# Patient Record
Sex: FEMALE | Race: AMERICAN INDIAN OR ALASKA NATIVE
[De-identification: names, ages, dates, MRNs, and addresses within clinical notes are randomized per-mention and may not be internally consistent; named-entity substitution may affect disease eponyms.]

---

## 2020-05-03 ENCOUNTER — HOSPITAL ENCOUNTER (EMERGENCY)
Dept: HOSPITAL 5 - ED | Age: 35
Discharge: HOME | End: 2020-05-03
Payer: COMMERCIAL

## 2020-05-03 VITALS — SYSTOLIC BLOOD PRESSURE: 129 MMHG | DIASTOLIC BLOOD PRESSURE: 84 MMHG

## 2020-05-03 DIAGNOSIS — Z79.899: ICD-10-CM

## 2020-05-03 DIAGNOSIS — K21.9: Primary | ICD-10-CM

## 2020-05-03 DIAGNOSIS — Z88.0: ICD-10-CM

## 2020-05-03 DIAGNOSIS — R11.2: ICD-10-CM

## 2020-05-03 DIAGNOSIS — R10.13: ICD-10-CM

## 2020-05-03 DIAGNOSIS — K80.20: ICD-10-CM

## 2020-05-03 LAB
ALBUMIN SERPL-MCNC: 4.2 G/DL (ref 3.9–5)
ALT SERPL-CCNC: 36 UNITS/L (ref 7–56)
BACTERIA #/AREA URNS HPF: (no result) /HPF
BASOPHILS # (AUTO): 0 K/MM3 (ref 0–0.1)
BASOPHILS NFR BLD AUTO: 0.2 % (ref 0–1.8)
BILIRUB UR QL STRIP: (no result)
BLOOD UR QL VISUAL: (no result)
BUN SERPL-MCNC: 9 MG/DL (ref 7–17)
BUN/CREAT SERPL: 11 %
CALCIUM SERPL-MCNC: 9.3 MG/DL (ref 8.4–10.2)
EOSINOPHIL # BLD AUTO: 0.1 K/MM3 (ref 0–0.4)
EOSINOPHIL NFR BLD AUTO: 1.2 % (ref 0–4.3)
HCT VFR BLD CALC: 41.8 % (ref 30.3–42.9)
HEMOLYSIS INDEX: 21
HGB BLD-MCNC: 13.2 GM/DL (ref 10.1–14.3)
LYMPHOCYTES # BLD AUTO: 3 K/MM3 (ref 1.2–5.4)
LYMPHOCYTES NFR BLD AUTO: 46.7 % (ref 13.4–35)
MCHC RBC AUTO-ENTMCNC: 32 % (ref 30–34)
MCV RBC AUTO: 81 FL (ref 79–97)
MONOCYTES # (AUTO): 0.5 K/MM3 (ref 0–0.8)
MONOCYTES % (AUTO): 8.2 % (ref 0–7.3)
MUCOUS THREADS #/AREA URNS HPF: (no result) /HPF
PH UR STRIP: 5 [PH] (ref 5–7)
PLATELET # BLD: 286 K/MM3 (ref 140–440)
PROT UR STRIP-MCNC: (no result) MG/DL
RBC # BLD AUTO: 5.16 M/MM3 (ref 3.65–5.03)
RBC #/AREA URNS HPF: 3 /HPF (ref 0–6)
UROBILINOGEN UR-MCNC: 2 MG/DL (ref ?–2)
WBC #/AREA URNS HPF: 3 /HPF (ref 0–6)

## 2020-05-03 PROCEDURE — 76705 ECHO EXAM OF ABDOMEN: CPT

## 2020-05-03 PROCEDURE — 83690 ASSAY OF LIPASE: CPT

## 2020-05-03 PROCEDURE — 96374 THER/PROPH/DIAG INJ IV PUSH: CPT

## 2020-05-03 PROCEDURE — 96375 TX/PRO/DX INJ NEW DRUG ADDON: CPT

## 2020-05-03 PROCEDURE — 36415 COLL VENOUS BLD VENIPUNCTURE: CPT

## 2020-05-03 PROCEDURE — 84703 CHORIONIC GONADOTROPIN ASSAY: CPT

## 2020-05-03 PROCEDURE — 85025 COMPLETE CBC W/AUTO DIFF WBC: CPT

## 2020-05-03 PROCEDURE — 96361 HYDRATE IV INFUSION ADD-ON: CPT

## 2020-05-03 PROCEDURE — 80053 COMPREHEN METABOLIC PANEL: CPT

## 2020-05-03 PROCEDURE — 99284 EMERGENCY DEPT VISIT MOD MDM: CPT

## 2020-05-03 PROCEDURE — 96372 THER/PROPH/DIAG INJ SC/IM: CPT

## 2020-05-03 PROCEDURE — 81001 URINALYSIS AUTO W/SCOPE: CPT

## 2020-05-03 NOTE — EMERGENCY DEPARTMENT REPORT
ED Abdominal Pain HPI





- General


Chief Complaint: Abdominal Pain


Stated Complaint: GALLSTONE PAIN


Source: patient


Mode of arrival: Ambulatory


Limitations: No Limitations





- History of Present Illness


Initial Comments: 





Patient is a A2 34-year-old -American female with no past medical 

history except gallstones and who presents to the ED with acute onset persistent

severe epigastric pain with intractable nausea and vomiting for the last 2 

hours.  Patient states that she was asleep when she suddenly woke up with 

persistent epigastric pain followed by nausea and vomiting.  Patient states that

she has had multiple nausea and vomiting episodes since the onset of the sym

ptoms.  Patient states that she was diagnosed with gallstones about 6 months ago

and is here to have the gallstones removed surgically.  Patient states that she 

however has not had any symptoms in the last 3 months.  Patient denies chest 

pain, shortness of breath, fever, chills, cough, sore throat, diarrhea, 

dizziness, syncope, headache, dysuria, urinary frequency and urgency, 

hematemesis, hematochezia or vaginal bleeding and vaginal discharge.


MD Complaint: abdominal pain (Epigastric), other (Nausea and vomiting)


-: Sudden, hour(s) (2)


Location: epigastric


Radiation: none


Migration to: no migration


Severity: severe


Severity scale (0 -10): 7


Quality: cramping, aching, sharp


Consistency: constant


Improves With: nothing


Worsens With: vomiting


Context: possible food poisoning, other (H/o gallstones)


Associated Symptoms: denies other symptoms, nausea, vomiting, anorexia.  denies:

diarrhea, fever, chills, constipation, dysuria, hematemesis, hematochezia, 

melena, hematuria, syncope, other





- Related Data


LMP Date: 20


                                  Previous Rx's











 Medication  Instructions  Recorded  Last Taken  Type


 


Dicyclomine [Bentyl] 20 mg PO Q6H PRN #30 tablet 20 Unknown Rx


 


Famotidine [Pepcid] 20 mg PO Q12H #60 tablet 20 Unknown Rx


 


Ondansetron [Zofran ODT TAB] 8 mg PO Q8HR PRN #20 tab.rapdis 20 Unknown Rx











                                    Allergies











Allergy/AdvReac Type Severity Reaction Status Date / Time


 


Penicillins Allergy  Rash Verified 20 02:33














ED Review of Systems


ROS: 


Stated complaint: GALLSTONE PAIN


Other details as noted in HPI





Constitutional: denies: chills, fever


Eyes: denies: eye pain, eye discharge, vision change


ENT: denies: ear pain, throat pain


Respiratory: denies: cough, shortness of breath, wheezing


Cardiovascular: denies: chest pain, palpitations


Endocrine: no symptoms reported


Gastrointestinal: abdominal pain (Epigastric), nausea, vomiting.  denies: 

diarrhea


Genitourinary: denies: urgency, dysuria, discharge


Musculoskeletal: denies: back pain, joint swelling, arthralgia


Skin: denies: rash, lesions


Neurological: denies: headache, weakness, paresthesias


Psychiatric: denies: anxiety, depression


Hematological/Lymphatic: denies: easy bleeding, easy bruising





ED Past Medical Hx





- Past Medical History


Previous Medical History?: No


Additional medical history: gallstones





- Social History


Smoking Status: Never Smoker


Substance Use Type: None





- Medications


Home Medications: 


                                Home Medications











 Medication  Instructions  Recorded  Confirmed  Last Taken  Type


 


Dicyclomine [Bentyl] 20 mg PO Q6H PRN #30 tablet 20  Unknown Rx


 


Famotidine [Pepcid] 20 mg PO Q12H #60 tablet 20  Unknown Rx


 


Ondansetron [Zofran ODT TAB] 8 mg PO Q8HR PRN #20 tab.rapdis 20  Unknown 

Rx














ED Physical Exam





- General


Limitations: No Limitations


General appearance: alert, in no apparent distress





- Head


Head exam: Present: atraumatic, normocephalic, normal inspection





- Eye


Eye exam: Present: normal appearance, PERRL, EOMI


Pupils: Present: normal accommodation





- ENT


ENT exam: Present: normal exam, normal orophraynx, mucous membranes moist, TM's 

normal bilaterally, normal external ear exam





- Neck


Neck exam: Present: normal inspection, full ROM.  Absent: tenderness





- Respiratory


Respiratory exam: Present: normal lung sounds bilaterally.  Absent: respiratory 

distress, wheezes, rales, rhonchi, chest wall tenderness, accessory muscle use





- Cardiovascular


Cardiovascular Exam: Present: normal rhythm, tachycardia, normal heart sounds.  

Absent: systolic murmur, diastolic murmur, rubs, gallop





- GI/Abdominal


GI/Abdominal exam: Present: soft, tenderness (Palpable moderate epigastric 

tenderness, no guarding or rebound), normal bowel sounds.  Absent: guarding, 

rebound, hyperactive bowel sounds





- Extremities Exam


Extremities exam: Present: normal inspection, full ROM, normal capillary refill





- Back Exam


Back exam: Present: normal inspection, full ROM.  Absent: tenderness, CVA 

tenderness (R), CVA tenderness (L), muscle spasm, paraspinal tenderness, 

vertebral tenderness





- Neurological Exam


Neurological exam: Present: alert, oriented X3, CN II-XII intact, normal gait, 

reflexes normal





- Psychiatric


Psychiatric exam: Present: normal affect, normal mood





- Skin


Skin exam: Present: warm, dry, intact, normal color.  Absent: rash





ED Course


                                   Vital Signs











  20





  02:30 05:23


 


Temperature 98.5 F 98 F


 


Pulse Rate 115 H 83


 


Respiratory 18 





Rate  


 


Blood Pressure 154/96 


 


Blood Pressure  129/84





[Left]  


 


O2 Sat by Pulse 97 98





Oximetry  














ED Medical Decision Making





- Lab Data


Result diagrams: 


                                 20 02:40





                                 20 02:40





- Radiology Data


Radiology results: report reviewed, image reviewed





Gallbladder US shows multiple gallstones in the gallbladder with no gallbladder 

wall thickness, and no pericholecystic fluid.





- Medical Decision Making





This is a A2 34-year-old -American female with no past medical 

history except gallstones and who presents to the ED with acute onset persistent

 severe epigastric pain with intractable nausea and vomiting for the last 2 

hours.  Patient states that she was asleep when she suddenly woke up with 

persistent epigastric pain followed by nausea and vomiting.  Patient states that

 she has had multiple nausea and vomiting episodes since the onset of the 

symptoms.  Patient states that she was diagnosed with gallstones about 6 months 

ago and is here to have the gallstones removed surgically.  Patient states that 

she however has not had any symptoms in the last 3 months.  In the ED, patient 

is alert and oriented x3 and is not in distress but tachycardic and anxious in 

triage.  Physical exam shows moderate epigastric tenderness without guarding or 

rebound.  Patient was treated for nausea and vomiting and also treated for pain 

and given antacids as well.  Patient also received 1 L LR IV bolus x1.  Lab test

 results were reviewed and showed elevated AST to 57 and hyperglycemia of 110 

mg/dL.  The rest of the lab test results including urinalysis was unremarkable. 

 Gallbladder ultrasound shows multiple stones in the gallbladder with no 

gallbladder wall thickness and no CBD obstruction.  On reevaluation, patient's 

pain is well controlled with medication, nausea and vomiting has also resolved. 

 Patient was discharged home and advised to maintain a clear liquid diet for 12 

to 24 hours while taking medications and to follow-up with a primary care physi

meaghan in 7 to 10 days for reevaluation.  Patient was also referred to the general

 surgeon on-call Dr. Flower for further evaluation.  Patient was advised to 

contact Dr. Flower's office first thing on Monday May 4, 2020 to schedule a 

follow-up appointment for possible cholecystectomy.  Patient was otherwise 

advised to return to the ED immediately if symptoms get worse.





- Differential Diagnosis


Gastritis; Gastroenteritis; Cholelithiasis; Pancreatitis; UTI; Pregnancy;


Critical care attestation.: 


If time is entered above; I have spent that time in minutes in the direct care 

of this critically ill patient, excluding procedure time.








ED Disposition


Clinical Impression: 


 Acute epigastric pain, Nausea and vomiting in adult, Cholelithiasis without 

cholecystitis





GERD (gastroesophageal reflux disease)


Qualifiers:


 Esophagitis presence: without esophagitis Qualified Code(s): K21.9 - Gastro-

esophageal reflux disease without esophagitis





Disposition: DC-01 TO HOME OR SELFCARE


Is pt being admited?: No


Does the pt Need Aspirin: No


Condition: Stable


Instructions:  Cholelithiasis (ED), Gastroesophageal Reflux Disease (ED), Acute 

Nausea and Vomiting (ED), Abdominal Pain (ED)


Additional Instructions: 


Maintain a clear liquid diet for 12 to 24 hours, take medication as needed for 

nausea and vomiting and pain and follow-up with the general surgeon on-call Dr. Flower for further evaluation and possible cholecystectomy procedure.  Please 

contact Dr. Flower's office first thing in the morning on Monday, May 4, 2020 to

 schedule a follow-up appointment.  Otherwise follow-up with your primary care 

physician in 7 to 10 days for reevaluation or return to the ED immediately if 

symptoms get worse.


Prescriptions: 


Dicyclomine [Bentyl] 20 mg PO Q6H PRN #30 tablet


 PRN Reason: Abdominal pain


Famotidine [Pepcid] 20 mg PO Q12H #60 tablet


Ondansetron [Zofran ODT TAB] 8 mg PO Q8HR PRN #20 tab.rapdis


 PRN Reason: Nausea


Referrals: 


ETIENNE FLOWER DO [Staff Physician] - 3-5 Days


Time of Disposition: 06:08


Print Language: ENGLISH

## 2020-05-04 NOTE — ULTRASOUND REPORT
ULTRASOUND ABDOMEN, LIMITED (RIGHT UPPER QUADRANT)



INDICATION:

Epigastric pain.



COMPARISON:

None available.



FINDINGS:

Pancreas: Visualized portion shows no significant abnormality.

Liver: 1.8 cm complex cyst.

Gallbladder: Gallstones. No wall thickening.  

Bile ducts: Normal. Common Bile Duct measures 4 mm. 



Free fluid: None.

Additional Findings: None.



IMPRESSION:

1. Gallstones. No wall thickening or biliary dilatation.

2. Small complex hepatic cyst.



Signer Name: Aramis El MD 

Signed: 5/3/2020 5:24 AM

Workstation Name: [email or web address]